# Patient Record
Sex: MALE | Race: WHITE | NOT HISPANIC OR LATINO | Employment: FULL TIME | URBAN - METROPOLITAN AREA
[De-identification: names, ages, dates, MRNs, and addresses within clinical notes are randomized per-mention and may not be internally consistent; named-entity substitution may affect disease eponyms.]

---

## 2024-08-28 ENCOUNTER — OFFICE VISIT (OUTPATIENT)
Dept: URGENT CARE | Facility: CLINIC | Age: 29
End: 2024-08-28
Payer: COMMERCIAL

## 2024-08-28 VITALS
HEIGHT: 72 IN | WEIGHT: 246.6 LBS | DIASTOLIC BLOOD PRESSURE: 78 MMHG | HEART RATE: 115 BPM | BODY MASS INDEX: 33.4 KG/M2 | TEMPERATURE: 97.9 F | OXYGEN SATURATION: 95 % | SYSTOLIC BLOOD PRESSURE: 106 MMHG

## 2024-08-28 DIAGNOSIS — S61.216A LACERATION OF RIGHT LITTLE FINGER WITHOUT DAMAGE TO NAIL, FOREIGN BODY PRESENCE UNSPECIFIED, INITIAL ENCOUNTER: ICD-10-CM

## 2024-08-28 NOTE — LETTER
August 28, 2024         Patient: Tan Dallas Jr.   YOB: 1995   Date of Visit: 8/28/2024           To Whom it May Concern:    Tan Dallas was seen in my clinic on 8/28/2024. He can return today 08/28/24, please excuse his absence from during our visit.    If you have any questions or concerns, please don't hesitate to call.        Sincerely,           MACK Toth  Electronically Signed

## 2024-08-28 NOTE — PROGRESS NOTES
Subjective:     Tan Dallas Jr. is a 28 y.o. male who presents for Laceration (Right hand pinky. Cut it at home on glass cup. This morning onset. )      Laceration     Cut right 5th digit with broken glass. No loss of ROM or sensation. Right hand dominant.  Cleaned on site.  No foreign bodies.  Tdap updated 2.5 years ago    ROS per HPI    Allergies: No Known Allergies    Current Medications:  This patient does not have an active medication from one of the medication groupers.    (Allergies, Medications, & Tobacco/Substance Use were reconciled by the Medical Assistant and reviewed by myself. )       Objective:     /78 (BP Location: Left arm, Patient Position: Sitting)   Pulse (!) 115   Temp 36.6 °C (97.9 °F) (Temporal)   Ht 1.829 m (6')   Wt 112 kg (246 lb 9.6 oz)   SpO2 95%   BMI 33.44 kg/m²     Physical Exam 3 cm full-thickness laceration fifth digit.  No palpable or visible foreign bodies.  No pulsatile bleeding.  NVID.  Normal ROM.    Assessment/Plan:     1. Laceration of right little finger without damage to nail, foreign body presence unspecified, initial encounter  amoxicillin-clavulanate (AUGMENTIN) 875-125 MG Tab        Laceration Repair     -Risks including bleeding, nerve damage, infection, and poor cosmetic outcome discussed at length. Benefits and alternatives discussed. Verbal consent was obtained.    Performed by: Jude RECINOS  Location details: Fifth digit right hand  Laceration length: 3 cm  Foreign bodies: no foreign bodies     Anesthesia:  Local Anesthetic: 1% without     Irrigation solution: saline  Irrigation method: syringe  Amount of cleaning: standard  Debridement: none  Degree of undermining: none  Skin closure: # 4 4-0 nylon simple interrupted    -Patient tolerated well with no complications or blood loss  -Wound care discussed  -Signs and symptoms of new/worsening infection discussed at length  -Return in 7-10 days for removal  -Tetanus current  -Prophylactic  antibiotic       Differential diagnosis, natural history, supportive care, and indications for immediate follow-up discussed.    Advised the patient to follow-up with the primary care physician for recheck, reevaluation, and consideration of further management.    Please note that this dictation was created using voice recognition software. I have made reasonable attempt to correct obvious errors, but I expect that there are errors of grammar and possibly content that I did not discover before finalizing the note.    This note was electronically signed by MACK Toth

## 2025-01-29 ENCOUNTER — PHARMACY VISIT (OUTPATIENT)
Dept: PHARMACY | Facility: MEDICAL CENTER | Age: 30
End: 2025-01-29
Payer: COMMERCIAL

## 2025-01-29 ENCOUNTER — HOSPITAL ENCOUNTER (EMERGENCY)
Facility: MEDICAL CENTER | Age: 30
End: 2025-01-29
Attending: STUDENT IN AN ORGANIZED HEALTH CARE EDUCATION/TRAINING PROGRAM
Payer: COMMERCIAL

## 2025-01-29 VITALS
HEIGHT: 73 IN | HEART RATE: 111 BPM | SYSTOLIC BLOOD PRESSURE: 182 MMHG | RESPIRATION RATE: 19 BRPM | OXYGEN SATURATION: 97 % | TEMPERATURE: 97.4 F | BODY MASS INDEX: 33.8 KG/M2 | DIASTOLIC BLOOD PRESSURE: 108 MMHG | WEIGHT: 255 LBS

## 2025-01-29 DIAGNOSIS — J01.00 ACUTE NON-RECURRENT MAXILLARY SINUSITIS: ICD-10-CM

## 2025-01-29 PROCEDURE — RXMED WILLOW AMBULATORY MEDICATION CHARGE: Performed by: STUDENT IN AN ORGANIZED HEALTH CARE EDUCATION/TRAINING PROGRAM

## 2025-01-29 PROCEDURE — 99281 EMR DPT VST MAYX REQ PHY/QHP: CPT

## 2025-01-29 RX ORDER — CETIRIZINE HYDROCHLORIDE 5 MG/1
TABLET ORAL
Qty: 30 TABLET | Refills: 0 | OUTPATIENT
Start: 2025-01-29

## 2025-01-29 RX ORDER — FLUTICASONE PROPIONATE 50 MCG
SPRAY, SUSPENSION (ML) NASAL
Qty: 16 G | Refills: 0 | OUTPATIENT
Start: 2025-01-29

## 2025-01-29 ASSESSMENT — PAIN DESCRIPTION - PAIN TYPE: TYPE: ACUTE PAIN

## 2025-01-29 NOTE — ED TRIAGE NOTES
"BACK CHARTING     Chief Complaint   Patient presents with    Sinus Pain     Pt reports pain/pressure and irritation to his sinuses x 1 week. Pt reports hx of sinus infections. States this feels similar. Denies fevers or chills      BP (!) 182/108   Pulse (!) 111   Temp 36.3 °C (97.4 °F) (Temporal)   Resp 19   Ht 1.854 m (6' 1\")   Wt 116 kg (255 lb)   SpO2 97%   BMI 33.64 kg/m²     "

## 2025-01-29 NOTE — ED PROVIDER NOTES
ER Provider Note    Scribed for Toan Buenrostro M.D. by Zaid Krishna. 1/29/2025   5:07 AM    Primary Care Provider: Pcp Pt States None    CHIEF COMPLAINT  Chief Complaint   Patient presents with    Sinus Pain     Pt reports pain/pressure and irritation to his sinuses x 1 week. Pt reports hx of sinus infections. States this feels similar. Denies fevers or chills        HPI/ROS  LIMITATION TO HISTORY   Select: : None  OUTSIDE HISTORIAN(S):  None     Tan Dallas is a 29 year old man who presents to the ED with a headache and congestion. He locates his headache to the cheeks and above the eyes. Yesterday the patient was working under a vehicle when he was using a contact . He states that dirt falling off the vehicle and fumes from this device may have contributed to his congestion and sinus headache. He states using a Coral-Pot improved the left sided inflammation. He states he feels pain in the left sinuses when breathing. He reports new cough. He is unsure if he has been febrile. He has been using a Vicks spray with an unknown active ingredient as well to treat his symptoms. He denies any allergies. He takes no daily medications. He denies having a primary care provider.       PAST MEDICAL HISTORY  History reviewed. No pertinent past medical history.    SURGICAL HISTORY  History reviewed. No pertinent surgical history.    FAMILY HISTORY  History reviewed. No pertinent family history.    SOCIAL HISTORY   reports that he has never smoked. He has never used smokeless tobacco. He reports current alcohol use. He reports current drug use. Drugs: Marijuana and Inhaled.    CURRENT MEDICATIONS  Discharge Medication List as of 1/29/2025  4:21 AM        CONTINUE these medications which have NOT CHANGED    Details   fluticasone (FLONASE) 50 MCG/ACT nasal spray spray 2 sprays in each nostril daily, Disp-16 g, R-0, Normal      amoxicillin-clavulanate (AUGMENTIN) 875-125 MG Tab take 1 tablet by mouth twice a day for  "5 days, Disp-10 Tablet, R-0, Normal      cetirizine (ZYRTEC) 5 MG tablet take 1 tablet by mouth once a day as needed for congestion, Disp-30 Tablet, R-0, Normal           ALLERGIES  No Known Allergies     PHYSICAL EXAM  Ht 1.854 m (6' 1\")   Wt 116 kg (255 lb)   BMI 33.64 kg/m²    General: 29 y.o. male sitting in bed in no acute distress.   Head: Normocephalic atraumatic  ENT: erythema of the bilateral nares, excoriation of the mucous membranes, posterior oropharyngeal erythema, mild sinus tenderness  Eyes: Extraocular motion intact  Neck: Supple, no rigidity  Cardiovascular: Regular rate and rhythm no murmurs rubs or gallops  Respiratory: Clear to auscultation bilaterally, equal chest rise and fall, no increased work of breathing  Abdomen: Soft nontender no guarding  Musculoskeletal: Warm and well perfused, no peripheral edema  Neuro: Alert, no focal deficits, Cranial nerves intact  Integumentary: No wounds or rashes     INITIAL ASSESSMENT COURSE AND PLAN  Care Narrative     1:55 AM - Patient was evaluated at bedside. They present for sinus headache and congestion. Pertinent PE findings include: erythema of the bilateral nares, excoriation of the mucous membranes, posterior oropharyngeal erythema, mild sinus tenderness. Differential diagnoses include but not limited to: Sinusitis, allergic rhinitis, gout, doubt cavernous venous thrombosis.   Will trial Flonase, Zyrtec, given symptoms started just after exposure, if not improving, patient is a wait and watch prescription for antibiotics.      This patient encounter was during extended medical record downtime which limited chart review.       Patient is agreeable to discharge, we discussed strict return precautions, and outpatient follow-up, patient was discharged in no acute distress.  All questions were answered prior to discharge.     DISPOSITION AND DISCUSSIONS    I have discussed management of the patient with the following physicians and URSULA's:  " None    Discussion of management with other [facility] or appropriate source(s): None     Escalation of care considered, and ultimately not performed: Laboratory analysis and diagnostic imaging.      Decision tools and prescription drugs considered including, but not limited to: Antibiotics given patient does not currently have a primary care doctor, a watch and wait prescription for antibiotics was provided after discussion with the patient. .    The patient will return for new or worsening symptoms and is stable at the time of discharge.  Patient was discharged during epic downtime, and was provided written discharge instructions and prescriptions    DISPOSITION:  Patient will be discharged home in stable condition.      OUTPATIENT MEDICATIONS:  Discharge Medication List as of 1/29/2025  4:21 AM        FINAL DIAGNOSIS  1. Acute non-recurrent maxillary sinusitis         IZaid (Negrita), am scribing for, and in the presence of, No att. providers found.    Electronically signed by: Zaid Krishna (Negrita), 1/29/2025    I, No att. providers found personally performed the services described in this documentation, as scribed by Zaid Krishna in my presence, and it is both accurate and complete.     The note accurately reflects work and decisions made by me.  Anibal Buenrostro M.D.  1/29/2025  5:49 AM     done